# Patient Record
Sex: MALE | Race: BLACK OR AFRICAN AMERICAN | Employment: UNEMPLOYED | URBAN - METROPOLITAN AREA
[De-identification: names, ages, dates, MRNs, and addresses within clinical notes are randomized per-mention and may not be internally consistent; named-entity substitution may affect disease eponyms.]

---

## 2021-05-24 DIAGNOSIS — E10.9 TYPE 1 DIABETES MELLITUS (HCC): Primary | ICD-10-CM

## 2021-06-11 ENCOUNTER — APPOINTMENT (OUTPATIENT)
Dept: ENDOCRINOLOGY | Facility: HOSPITAL | Age: 56
End: 2021-06-11
Attending: NURSE PRACTITIONER
Payer: MEDICARE

## 2021-06-28 ENCOUNTER — HOSPITAL ENCOUNTER (OUTPATIENT)
Dept: ENDOCRINOLOGY | Facility: HOSPITAL | Age: 56
Discharge: HOME | End: 2021-06-28
Attending: NURSE PRACTITIONER
Payer: MEDICARE

## 2021-06-28 VITALS — WEIGHT: 315 LBS

## 2021-06-28 DIAGNOSIS — E10.65 UNCONTROLLED TYPE 1 DIABETES MELLITUS WITH HYPERGLYCEMIA (HCC): Primary | ICD-10-CM

## 2021-06-28 NOTE — PROGRESS NOTES
Jose Guevara  : 1965 was seen for Initial Individual Pre-Pump Education:    Date: 2021   Start time: 1145A End time: 56P    With wife Joanne Nichols at time of visit  Referral from [facility], visits with Dr. Chelsi Ferrari after CGM revealed high PM sugars glucose QID  Reminded to keep records and bring to pump initiation  Taught pre-pump guidelines   Advised OLVIN to use vendor training resources    Patient Goals/Recommendations: Patient chosen goals included in patient's instructions for today.       Pump I

## 2021-06-28 NOTE — PATIENT INSTRUCTIONS
Goals        Patient Stated    1. EDC - Insulin Pump (pt-stated)       Note created  6/28/2021 12:34 PM by Tali Johns RD      Bring all supplies to next visit, so we can assess type of pump OR take pictures of all of the equipment      2.   EDC - Me

## 2021-07-13 ENCOUNTER — HOSPITAL ENCOUNTER (OUTPATIENT)
Dept: ENDOCRINOLOGY | Facility: HOSPITAL | Age: 56
Discharge: HOME | End: 2021-07-13
Attending: NURSE PRACTITIONER
Payer: MEDICARE

## 2021-07-13 DIAGNOSIS — E10.65 UNCONTROLLED TYPE 1 DIABETES MELLITUS WITH HYPERGLYCEMIA (HCC): Primary | ICD-10-CM

## 2021-07-13 NOTE — PROGRESS NOTES
Hilton Edeny 11/28/1965 attended for Intensive Individual Management/ Follow Up/ Prepump:    Date: 7/13/2021      Start Time: 1300        End Time: 1400    Pt here for 2nd pre pump visit.   Last visit, pt was not checking his sugars or taking his insulin con deliver insulin via basal/bolus  Advised OLVIN to use vendor training resources- Blue workbook: [employer] guide      Patient Goals/Recommendations: Patient chosen goals included in patient's instructions for today.       Pump Initiation scheduled for TB

## 2023-01-24 ENCOUNTER — OFFICE VISIT (OUTPATIENT)
Dept: NEUROLOGY | Facility: CLINIC | Age: 58
End: 2023-01-24
Payer: MEDICARE

## 2023-01-24 VITALS
SYSTOLIC BLOOD PRESSURE: 122 MMHG | HEIGHT: 69 IN | HEART RATE: 78 BPM | BODY MASS INDEX: 46.65 KG/M2 | WEIGHT: 315 LBS | DIASTOLIC BLOOD PRESSURE: 78 MMHG

## 2023-01-24 DIAGNOSIS — R25.1 TREMOR: Primary | ICD-10-CM

## 2023-01-24 PROCEDURE — 99205 OFFICE O/P NEW HI 60 MIN: CPT | Performed by: OTHER

## 2023-01-24 RX ORDER — FUROSEMIDE 40 MG/1
40 TABLET ORAL 2 TIMES DAILY
COMMUNITY
Start: 2022-11-07

## 2023-01-24 RX ORDER — GABAPENTIN 400 MG/1
400 CAPSULE ORAL 3 TIMES DAILY
COMMUNITY
Start: 2023-01-10

## 2023-01-24 RX ORDER — ACETAMINOPHEN AND CODEINE PHOSPHATE 60; 300 MG/1; MG/1
1-2 TABLET ORAL EVERY 6 HOURS PRN
COMMUNITY
Start: 2023-01-05

## 2023-01-24 RX ORDER — ASPIRIN 81 MG/1
81 TABLET ORAL DAILY
COMMUNITY

## 2023-01-24 RX ORDER — LISINOPRIL 40 MG/1
1 TABLET ORAL AS DIRECTED
COMMUNITY
Start: 2022-02-01

## 2023-01-24 RX ORDER — INSULIN GLARGINE 100 [IU]/ML
55 INJECTION, SOLUTION SUBCUTANEOUS DAILY
COMMUNITY
Start: 2021-06-30

## 2023-01-24 RX ORDER — ATORVASTATIN CALCIUM 80 MG/1
1 TABLET, FILM COATED ORAL AS DIRECTED
COMMUNITY
Start: 2022-02-01

## (undated) NOTE — LETTER
[facility]  [address] [facility]       01/24/23        Patient: Chelsie Schmidt   YOB: 1965   Date of Visit: 1/24/2023       Dear  Dr. Rebecca Quintanilla MD,      Thank you for referring Chelsie Schmidt to my practice. Please find my assessment and plan below. Chelsie Schmidt is a 59year old male w/ a pmhx  of  CTS SARINA on CPAP DM, ?undiagnosed DM neuropathy HTN HLD who presents for  Months of r sided tremors and orthostatic intolerance. He has a minimal intention tremor on the right, postural tremor and no rest tremor. May have right sided rigidity. He has a classic glove/stocking pattern of sensory loss. He has no reflexes. Etiology of tremor is not clear. May be essential tremor but pattern and history are not typical for it. Could be parkinson disease but his rigidity was not impressive. Leg tremor would support pd and argue against essential tremor. No consistent with orthostatic tremor. Reports his tremor currently is not that bothersome. He was not on a higher dose of gabapentin. May have begun after after he had and COURTNEY due to diuresis. Tremor was worse in the summer and in the fall (up to Nov). Tremor is not bothering him enough to treat per the pt. 1.  tremors and ?orthostasis  Differential Diagnosis:  1. Tremor d/t dm neuropathy/neuropathy w/ autonomic effects  2. Tremor due to essential tremor and ? Autonomic neuropathy  3. idiopathic parkinson disease  4. Alpha synucelopathy  Diagnostics:  1. Bring in outside labs  2.  check orthostatic vitals  Therapeutics:  1. Take a video when you have the tremor  2.  pt does not want to tx currently; will monitor    There are no diagnoses linked to this encounter. No orders of the defined types were placed in this encounter.                  Sincerely,   Delphine Whyte,    [facility] [address] [facility]  [address] [address]    Document electronically generated by:  Nikolay Shah DO